# Patient Record
(demographics unavailable — no encounter records)

---

## 2017-04-04 NOTE — EMG
PATIENT:PARVIN JANG                             DATE OF SERVICE: 04/03/17
                                               MEDICAL RECORD: [id number]
DATE OF BIRTH: 11/10/48                        LOCATION:         [facility] 
                                               ADMISSION DATE:         
REFERRING PHYSICIAN: DIANE BRYANT MD           
 
INTERPRETING PHYSICIAN: LILIA ULLOA MD            
 
 
DATE OF SERVICE:  04/03/2017
 
Electromyographic Report
 
Referred as an outpatient by Dr. Bryant.
 
ELECTROMYOGRAPHIC DATA:  Electromyographic examination is limited to both upper
extremities.
 
In the right upper extremity, right median motor stimulation elicits a compound
motor action potential with a distal latency of 3.4 milliseconds, peak amplitude
of 2 millivolts and calculated conduction velocity of 56 meters per second. 
Right ulnar motor stimulation elicits a compound motor action potential with a
distal latency of 2.8 milliseconds, peak amplitude of 7 millivolts, and
calculated conduction velocity of 65 meters per second.  Right ulnar motor
stimulation across the elbow fails to elicit evidence of conduction block at
this level.  Antidromic right median sensory stimulation elicits a response with
a distal latency of 4.6 milliseconds and amplitude of 1 microvolt.  This is a
poorly defined response.  Proximal stimulation does not elicit a reliable
response.  Antidromic right ulnar sensory stimulation elicits a response with a
distal latency of 3.1 milliseconds, amplitude of 5 microvolts and calculated
conduction velocity of 61 meters per second.  The right median F wave is absent.
 
In the left upper extremity, left median motor stimulation elicits a compound
motor action potential with a distal latency of 4.0 milliseconds, peak amplitude
of 4 millivolts, and calculated conduction velocity of 55 meters per second. 
Left ulnar motor stimulation elicits a compound motor action potential with a
distal latency of 2.2 milliseconds, peak amplitude of 7 millivolts, and
calculated conduction velocity of 64 meters per second.  Left ulnar motor
stimulation across the elbow fails to elicit evidence of conduction block at
this level.  Antidromic left median sensory stimulation elicits a response with
a distal latency of 7.0 milliseconds, amplitude of only 2 microvolts and
calculated conduction velocity of 41 meters per second.  Antidromic left ulnar
sensory stimulation elicits a response with a distal latency of 3.1
milliseconds, amplitude of 18 microvolts and calculated conduction velocity of
64 meters per second.  The left median F wave has a latency of 39 milliseconds.
 
Needle electrode examination is limited to both upper extremities as well. 
Muscles interrogated include the abductor pollicis brevis, first dorsal
interosseous, abductor digiti minimi, pronator teres, biceps brachii, triceps
and deltoid.  There is no abnormality of insertional activity and no abnormal
spontaneous activity is seen in all muscles interrogated.  Motor unit potential
morphology and the pattern of motor unit potential firing and recruitment is
normal in all muscles sampled.
 
INTERPRETATION:  Electromyographic examination of both upper extremities is
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
greater than right, moderately severe in degree electrically on the left,
consistent with the diagnosis of bilateral carpal tunnel syndrome.  There is no
 
 
 
ELECTROMYGRAM/NERVE CONDUCTION                 [id number]    PARVIN JANG              
 
 
electrical evidence of a superimposed cervical radiculopathy or other lesion of
the lower motor neuron in the upper extremities at this time.  There is no
evidence of active denervation.
 
TRANSINT:[id number] Voice Confirmation ID: [id number] DOCUMENT ID: [id number]
                                           
                                           LILIA ULLOA MD            
 
 
 
Electronically Signed by LILIA ULLOA on 04/04/17 at 1005
 
 
 
 
 
 
 
 
 
 
 
 
 
 
 
 
 
 
 
 
 
 
 
 
 
 
 
 
 
 
 
 
 
 
 
CC:                                                             [phone]
DICTATION DATE: 04/03/17 0927     :     04/04/17 0039      DEP CLI 
                                                                      04/03/17
[facility]                                          
[address] [place] [address]